# Patient Record
Sex: FEMALE | Race: WHITE | NOT HISPANIC OR LATINO | Employment: FULL TIME | URBAN - METROPOLITAN AREA
[De-identification: names, ages, dates, MRNs, and addresses within clinical notes are randomized per-mention and may not be internally consistent; named-entity substitution may affect disease eponyms.]

---

## 2023-05-22 LAB
CHLAMYDIA TRACHOMATIS RNA, TMA, UROGENITAL: NEGATIVE
HUMAN PAPILLOMAVIRUS (HPV): NORMAL
PAP RECOMMENDATION EXT: NORMAL
PAP SMEAR: NORMAL

## 2024-01-10 ENCOUNTER — OFFICE VISIT (OUTPATIENT)
Dept: FAMILY MEDICINE | Facility: CLINIC | Age: 34
End: 2024-01-10
Payer: COMMERCIAL

## 2024-01-10 VITALS
TEMPERATURE: 98 F | BODY MASS INDEX: 32.18 KG/M2 | HEIGHT: 64 IN | WEIGHT: 188.5 LBS | HEART RATE: 80 BPM | SYSTOLIC BLOOD PRESSURE: 122 MMHG | DIASTOLIC BLOOD PRESSURE: 74 MMHG | OXYGEN SATURATION: 98 %

## 2024-01-10 DIAGNOSIS — N20.0 NEPHROLITHIASIS: ICD-10-CM

## 2024-01-10 DIAGNOSIS — R19.8 ALTERNATING CONSTIPATION AND DIARRHEA: ICD-10-CM

## 2024-01-10 DIAGNOSIS — Z76.89 ENCOUNTER TO ESTABLISH CARE: Primary | ICD-10-CM

## 2024-01-10 DIAGNOSIS — R59.1 LYMPHADENOPATHY, GENERALIZED: ICD-10-CM

## 2024-01-10 DIAGNOSIS — M54.50 LOW BACK PAIN WITHOUT SCIATICA, UNSPECIFIED BACK PAIN LATERALITY, UNSPECIFIED CHRONICITY: ICD-10-CM

## 2024-01-10 DIAGNOSIS — R30.0 DYSURIA: ICD-10-CM

## 2024-01-10 DIAGNOSIS — D50.8 IRON DEFICIENCY ANEMIA SECONDARY TO INADEQUATE DIETARY IRON INTAKE: ICD-10-CM

## 2024-01-10 LAB
BILIRUB SERPL-MCNC: NEGATIVE MG/DL
BLOOD URINE, POC: NEGATIVE
CLARITY, POC UA: CLEAR
COLOR, POC UA: YELLOW
GLUCOSE UR QL STRIP: NEGATIVE
KETONES UR QL STRIP: NEGATIVE
LEUKOCYTE ESTERASE URINE, POC: NEGATIVE
NITRITE, POC UA: NEGATIVE
PH, POC UA: 6.5
PROTEIN, POC: NEGATIVE
SPECIFIC GRAVITY, POC UA: 1.02
UROBILINOGEN, POC UA: 0.2

## 2024-01-10 PROCEDURE — 99999 PR PBB SHADOW E&M-EST. PATIENT-LVL IV: CPT | Mod: PBBFAC,,, | Performed by: STUDENT IN AN ORGANIZED HEALTH CARE EDUCATION/TRAINING PROGRAM

## 2024-01-10 PROCEDURE — 3008F BODY MASS INDEX DOCD: CPT | Mod: CPTII,S$GLB,, | Performed by: STUDENT IN AN ORGANIZED HEALTH CARE EDUCATION/TRAINING PROGRAM

## 2024-01-10 PROCEDURE — 87491 CHLMYD TRACH DNA AMP PROBE: CPT | Performed by: STUDENT IN AN ORGANIZED HEALTH CARE EDUCATION/TRAINING PROGRAM

## 2024-01-10 PROCEDURE — 81002 URINALYSIS NONAUTO W/O SCOPE: CPT | Mod: S$GLB,,, | Performed by: STUDENT IN AN ORGANIZED HEALTH CARE EDUCATION/TRAINING PROGRAM

## 2024-01-10 PROCEDURE — 1160F RVW MEDS BY RX/DR IN RCRD: CPT | Mod: CPTII,S$GLB,, | Performed by: STUDENT IN AN ORGANIZED HEALTH CARE EDUCATION/TRAINING PROGRAM

## 2024-01-10 PROCEDURE — 3078F DIAST BP <80 MM HG: CPT | Mod: CPTII,S$GLB,, | Performed by: STUDENT IN AN ORGANIZED HEALTH CARE EDUCATION/TRAINING PROGRAM

## 2024-01-10 PROCEDURE — 1159F MED LIST DOCD IN RCRD: CPT | Mod: CPTII,S$GLB,, | Performed by: STUDENT IN AN ORGANIZED HEALTH CARE EDUCATION/TRAINING PROGRAM

## 2024-01-10 PROCEDURE — 3074F SYST BP LT 130 MM HG: CPT | Mod: CPTII,S$GLB,, | Performed by: STUDENT IN AN ORGANIZED HEALTH CARE EDUCATION/TRAINING PROGRAM

## 2024-01-10 PROCEDURE — 99214 OFFICE O/P EST MOD 30 MIN: CPT | Mod: S$GLB,,, | Performed by: STUDENT IN AN ORGANIZED HEALTH CARE EDUCATION/TRAINING PROGRAM

## 2024-01-10 RX ORDER — TIZANIDINE 2 MG/1
4 TABLET ORAL EVERY 8 HOURS PRN
Qty: 30 TABLET | Refills: 0 | Status: SHIPPED | OUTPATIENT
Start: 2024-01-10 | End: 2024-01-20

## 2024-01-10 RX ORDER — TAMSULOSIN HYDROCHLORIDE 0.4 MG/1
0.4 CAPSULE ORAL DAILY
Qty: 30 CAPSULE | Refills: 1 | Status: SHIPPED | OUTPATIENT
Start: 2024-01-10

## 2024-01-10 NOTE — PROGRESS NOTES
SUBJECTIVE:    CHIEF COMPLAINT:   Chief Complaint   Patient presents with    Establish Care     Swollen lymph nodes            274}    HISTORY OF PRESENT ILLNESS:  Clare Neal is a 33 y.o. female with history of positive cervical HPV s/p resection, nephrolithiasis, who presents to the clinic to establish care.  She reports that she continues to experience some left-sided low back pain which is intermittent in nature.  Prior CT imaging performed in 2023 notable for 2 mm renal stone.  She also reports that she occasionally has diarrhea and constipation.  Symptoms have been associated with bloating and she is unsure of which foods are causative of symptoms.    Discussed keeping a food diary of foods that may cause bloating.  Further recommended that she try tamsulosin to assist with passing renal stone.  Patient in agreement with care plan. Denies any fevers, chills, chest pain, shortness of breath, nausea vomiting or diarrhea     Last Pap Smear: Apr/May 2023 - Normal     PAST MEDICAL HISTORY:     274}  Past Medical History:   Diagnosis Date    COVID        PAST SURGICAL HISTORY:  Past Surgical History:   Procedure Laterality Date    ANKLE SURGERY Left      SECTION      ROBOT-ASSISTED CHOLECYSTECTOMY USING DA JANA XI N/A 2021    Procedure: XI ROBOTIC CHOLECYSTECTOMY;  Surgeon: Stewart Valverde MD;  Location: [facility];  Service: General;  Laterality: N/A;       SOCIAL HISTORY:  Social History     Socioeconomic History    Marital status:    Tobacco Use    Smoking status: Former    Smokeless tobacco: Never   Substance and Sexual Activity    Alcohol use: Yes     Comment: socially    Drug use: Never       FAMILY HISTORY:       History reviewed. No pertinent family history.    ALLERGIES AND MEDICATIONS: updated and reviewed.      274}  Review of patient's allergies indicates:  No Known Allergies  Medication List with Changes/Refills   New Medications    TAMSULOSIN (FLOMAX) 0.4 MG CAP     "Take 1 capsule (0.4 mg total) by mouth once daily.    TIZANIDINE (ZANAFLEX) 2 MG TABLET    Take 2 tablets (4 mg total) by mouth every 8 (eight) hours as needed (Back pain).   Current Medications    FERROUS SULFATE (IRON ORAL)    Take by mouth once daily.   Discontinued Medications    FLUOCINONIDE-EMOLLIENT (FLUOCINONIDE-E) 0.05 % CREA    Apply topically 2 (two) times daily.    PHENTERMINE (ADIPEX-P) 37.5 MG TABLET    TAKE 1 TABLET(37.5 MG) BY MOUTH BEFORE BREAKFAST       SCREENING HISTORY:    274}  Health Maintenance         Date Due Completion Date    COVID-19 Vaccine (1) Never done ---    TETANUS VACCINE Never done ---    Influenza Vaccine (1) 09/01/2023 10/18/2022 (Declined)    Override on 10/18/2022: Declined    Cervical Cancer Screening 04/07/2024 4/7/2021            REVIEW OF SYSTEMS:   Review of Systems   All other systems reviewed and are negative.      PHYSICAL EXAM:      274}  /74 (BP Location: Right arm, Patient Position: Sitting, BP Method: Small (Manual))   Pulse 80   Temp 97.9 °F (36.6 °C)   Ht 5' 4" (1.626 m)   Wt 85.5 kg (188 lb 7.9 oz)   SpO2 98%   BMI 32.35 kg/m²   Wt Readings from Last 3 Encounters:   01/10/24 85.5 kg (188 lb 7.9 oz)   09/29/23 81.6 kg (180 lb)   09/27/23 81.4 kg (179 lb 8 oz)     BP Readings from Last 3 Encounters:   01/10/24 122/74   09/29/23 116/70   09/27/23 124/82     Estimated body mass index is 32.35 kg/m² as calculated from the following:    Height as of this encounter: 5' 4" (1.626 m).    Weight as of this encounter: 85.5 kg (188 lb 7.9 oz).     Physical Exam  Vitals reviewed.   Constitutional:       General: She is not in acute distress.     Appearance: Normal appearance. She is well-developed and normal weight. She is not ill-appearing.   HENT:      Head: Normocephalic and atraumatic.      Right Ear: External ear normal.      Left Ear: External ear normal.      Nose: Nose normal.   Eyes:      Extraocular Movements: Extraocular movements intact.      " Conjunctiva/sclera: Conjunctivae normal.      Pupils: Pupils are equal, round, and reactive to light.   Neck:      Thyroid: No thyroid mass.   Cardiovascular:      Rate and Rhythm: Normal rate and regular rhythm.      Pulses: Normal pulses.      Heart sounds: Normal heart sounds, S1 normal and S2 normal. No murmur heard.     No gallop.   Pulmonary:      Effort: Pulmonary effort is normal. No respiratory distress.      Breath sounds: Normal breath sounds and air entry. No stridor. No wheezing, rhonchi or rales.   Abdominal:      General: Bowel sounds are normal. There is no distension.      Palpations: Abdomen is soft. There is no mass.      Tenderness: There is no abdominal tenderness.   Musculoskeletal:         General: No swelling or deformity. Normal range of motion.      Cervical back: Normal range of motion and neck supple. No edema or erythema.   Skin:     General: Skin is warm and dry.      Findings: No lesion or rash.   Neurological:      General: No focal deficit present.      Mental Status: She is alert and oriented to person, place, and time. Mental status is at baseline.   Psychiatric:         Mood and Affect: Mood normal.         Behavior: Behavior normal. Behavior is cooperative.         Judgment: Judgment normal.         LABS:   274}  I have reviewed old labs below:  Lab Results   Component Value Date    WBC 5.50 09/29/2023    HGB 12.3 09/29/2023    HCT 37.4 09/29/2023    MCV 84 09/29/2023     09/29/2023     09/29/2023    K 3.8 09/29/2023     09/29/2023    CALCIUM 9.2 09/29/2023    CO2 28 09/29/2023    GLU 85 09/29/2023    BUN 14 09/29/2023    CREATININE 0.5 09/29/2023    ANIONGAP 6 (L) 09/29/2023    ESTGFRAFRICA >60.0 06/29/2022    EGFRNONAA >60.0 06/29/2022    PROT 7.3 09/29/2023    ALBUMIN 4.4 09/29/2023    BILITOT 0.4 09/29/2023    ALKPHOS 72 09/29/2023    ALT 51 (H) 09/29/2023    AST 33 09/29/2023    CHOL 147 06/29/2022    TRIG 85 06/29/2022    HDL 48 06/29/2022    LDLCALC 82.0  06/29/2022    TSH 2.570 06/29/2022       ASSESSMENT AND PLAN:  274}  1. Encounter to establish care  -     HIV 1/2 Ag/Ab (4th Gen); Future; Expected date: 01/10/2024  -     Hepatitis C Antibody; Future; Expected date: 01/10/2024  -     TSH; Future; Expected date: 01/10/2024  -     Lipid Panel; Future; Expected date: 01/10/2024  -     Hemoglobin A1C; Future; Expected date: 01/10/2024  -     Comprehensive Metabolic Panel; Future; Expected date: 01/10/2024  -     CBC Without Differential; Future; Expected date: 01/10/2024  -     Ferritin; Future; Expected date: 01/10/2024    2. Dysuria  Comments:  neg POCT testing  Orders:  -     POCT URINE DIPSTICK WITHOUT MICROSCOPE  -     C. trachomatis/N. gonorrhoeae by AMP DNA; Future; Expected date: 01/10/2024    3. Lymphadenopathy, generalized  -     HETEROPHILE AB SCREEN; Future; Expected date: 01/10/2024    4. Iron deficiency anemia secondary to inadequate dietary iron intake  -     IRON AND TIBC; Future; Expected date: 01/10/2024  -     Transferrin; Future; Expected date: 01/10/2024    5. Nephrolithiasis  -     tamsulosin (FLOMAX) 0.4 mg Cap; Take 1 capsule (0.4 mg total) by mouth once daily.  Dispense: 30 capsule; Refill: 1    6. Alternating constipation and diarrhea  Comments:  Recommended keeping food diary.  If diarrhea persistent suggested stool studies and possible GI referral    7. Low back pain without sciatica, unspecified back pain laterality, unspecified chronicity  -     tiZANidine (ZANAFLEX) 2 MG tablet; Take 2 tablets (4 mg total) by mouth every 8 (eight) hours as needed (Back pain).  Dispense: 30 tablet; Refill: 0           Orders Placed This Encounter   Procedures    C. trachomatis/N. gonorrhoeae by AMP DNA    HIV 1/2 Ag/Ab (4th Gen)    Hepatitis C Antibody    TSH    Lipid Panel    Hemoglobin A1C    Comprehensive Metabolic Panel    CBC Without Differential    Ferritin    HETEROPHILE AB SCREEN    IRON AND TIBC    Transferrin    POCT URINE DIPSTICK WITHOUT  MICROSCOPE       Follow up in about 4 weeks (around 2/7/2024) for Virtual Visit - f/u labs, Flomax use. or sooner as needed.

## 2024-01-11 RX ORDER — TAMSULOSIN HYDROCHLORIDE 0.4 MG/1
0.4 CAPSULE ORAL
Qty: 90 CAPSULE | OUTPATIENT
Start: 2024-01-11

## 2024-01-12 ENCOUNTER — LAB VISIT (OUTPATIENT)
Dept: LAB | Facility: HOSPITAL | Age: 34
End: 2024-01-12
Attending: STUDENT IN AN ORGANIZED HEALTH CARE EDUCATION/TRAINING PROGRAM
Payer: COMMERCIAL

## 2024-01-12 DIAGNOSIS — D50.8 IRON DEFICIENCY ANEMIA SECONDARY TO INADEQUATE DIETARY IRON INTAKE: ICD-10-CM

## 2024-01-12 DIAGNOSIS — R59.1 LYMPHADENOPATHY, GENERALIZED: ICD-10-CM

## 2024-01-12 DIAGNOSIS — Z76.89 ENCOUNTER TO ESTABLISH CARE: ICD-10-CM

## 2024-01-12 LAB
ALBUMIN SERPL BCP-MCNC: 3.8 G/DL (ref 3.5–5.2)
ALP SERPL-CCNC: 57 U/L (ref 55–135)
ALT SERPL W/O P-5'-P-CCNC: 29 U/L (ref 10–44)
ANION GAP SERPL CALC-SCNC: 8 MMOL/L (ref 8–16)
AST SERPL-CCNC: 20 U/L (ref 10–40)
BILIRUB SERPL-MCNC: 0.5 MG/DL (ref 0.1–1)
BUN SERPL-MCNC: 13 MG/DL (ref 6–20)
C TRACH DNA SPEC QL NAA+PROBE: NOT DETECTED
CALCIUM SERPL-MCNC: 8.7 MG/DL (ref 8.7–10.5)
CHLORIDE SERPL-SCNC: 109 MMOL/L (ref 95–110)
CHOLEST SERPL-MCNC: 143 MG/DL (ref 120–199)
CHOLEST/HDLC SERPL: 3 {RATIO} (ref 2–5)
CO2 SERPL-SCNC: 24 MMOL/L (ref 23–29)
CREAT SERPL-MCNC: 0.6 MG/DL (ref 0.5–1.4)
ERYTHROCYTE [DISTWIDTH] IN BLOOD BY AUTOMATED COUNT: 13.2 % (ref 11.5–14.5)
EST. GFR  (NO RACE VARIABLE): >60 ML/MIN/1.73 M^2
ESTIMATED AVG GLUCOSE: 97 MG/DL (ref 68–131)
FERRITIN SERPL-MCNC: 41 NG/ML (ref 20–300)
GLUCOSE SERPL-MCNC: 98 MG/DL (ref 70–110)
HBA1C MFR BLD: 5 % (ref 4–5.6)
HCT VFR BLD AUTO: 39.3 % (ref 37–48.5)
HCV AB SERPL QL IA: NORMAL
HDLC SERPL-MCNC: 48 MG/DL (ref 40–75)
HDLC SERPL: 33.6 % (ref 20–50)
HETEROPH AB SERPL QL IA: NEGATIVE
HGB BLD-MCNC: 12.8 G/DL (ref 12–16)
HIV 1+2 AB+HIV1 P24 AG SERPL QL IA: NORMAL
IRON SERPL-MCNC: 76 UG/DL (ref 30–160)
LDLC SERPL CALC-MCNC: 78.6 MG/DL (ref 63–159)
MCH RBC QN AUTO: 28.8 PG (ref 27–31)
MCHC RBC AUTO-ENTMCNC: 32.6 G/DL (ref 32–36)
MCV RBC AUTO: 88 FL (ref 82–98)
N GONORRHOEA DNA SPEC QL NAA+PROBE: NOT DETECTED
NONHDLC SERPL-MCNC: 95 MG/DL
PLATELET # BLD AUTO: 214 K/UL (ref 150–450)
PMV BLD AUTO: 11.1 FL (ref 9.2–12.9)
POTASSIUM SERPL-SCNC: 3.9 MMOL/L (ref 3.5–5.1)
PROT SERPL-MCNC: 6.5 G/DL (ref 6–8.4)
RBC # BLD AUTO: 4.45 M/UL (ref 4–5.4)
SATURATED IRON: 19 % (ref 20–50)
SODIUM SERPL-SCNC: 141 MMOL/L (ref 136–145)
TOTAL IRON BINDING CAPACITY: 400 UG/DL (ref 250–450)
TRANSFERRIN SERPL-MCNC: 270 MG/DL (ref 200–375)
TRANSFERRIN SERPL-MCNC: 270 MG/DL (ref 200–375)
TRIGL SERPL-MCNC: 82 MG/DL (ref 30–150)
TSH SERPL DL<=0.005 MIU/L-ACNC: 1.4 UIU/ML (ref 0.4–4)
WBC # BLD AUTO: 4.02 K/UL (ref 3.9–12.7)

## 2024-01-12 PROCEDURE — 83540 ASSAY OF IRON: CPT | Performed by: STUDENT IN AN ORGANIZED HEALTH CARE EDUCATION/TRAINING PROGRAM

## 2024-01-12 PROCEDURE — 84443 ASSAY THYROID STIM HORMONE: CPT | Performed by: STUDENT IN AN ORGANIZED HEALTH CARE EDUCATION/TRAINING PROGRAM

## 2024-01-12 PROCEDURE — 82728 ASSAY OF FERRITIN: CPT | Performed by: STUDENT IN AN ORGANIZED HEALTH CARE EDUCATION/TRAINING PROGRAM

## 2024-01-12 PROCEDURE — 36415 COLL VENOUS BLD VENIPUNCTURE: CPT | Mod: PO | Performed by: STUDENT IN AN ORGANIZED HEALTH CARE EDUCATION/TRAINING PROGRAM

## 2024-01-12 PROCEDURE — 85027 COMPLETE CBC AUTOMATED: CPT | Performed by: STUDENT IN AN ORGANIZED HEALTH CARE EDUCATION/TRAINING PROGRAM

## 2024-01-12 PROCEDURE — 80061 LIPID PANEL: CPT | Performed by: STUDENT IN AN ORGANIZED HEALTH CARE EDUCATION/TRAINING PROGRAM

## 2024-01-12 PROCEDURE — 80053 COMPREHEN METABOLIC PANEL: CPT | Performed by: STUDENT IN AN ORGANIZED HEALTH CARE EDUCATION/TRAINING PROGRAM

## 2024-01-12 PROCEDURE — 86308 HETEROPHILE ANTIBODY SCREEN: CPT | Mod: PO | Performed by: STUDENT IN AN ORGANIZED HEALTH CARE EDUCATION/TRAINING PROGRAM

## 2024-01-12 PROCEDURE — 83036 HEMOGLOBIN GLYCOSYLATED A1C: CPT | Performed by: STUDENT IN AN ORGANIZED HEALTH CARE EDUCATION/TRAINING PROGRAM

## 2024-01-12 PROCEDURE — 86803 HEPATITIS C AB TEST: CPT | Performed by: STUDENT IN AN ORGANIZED HEALTH CARE EDUCATION/TRAINING PROGRAM

## 2024-01-12 PROCEDURE — 87389 HIV-1 AG W/HIV-1&-2 AB AG IA: CPT | Performed by: STUDENT IN AN ORGANIZED HEALTH CARE EDUCATION/TRAINING PROGRAM

## 2024-02-07 NOTE — PATIENT INSTRUCTIONS
Jose C Sparks,     If you are due for any health screening(s) below please notify me so we can arrange them to be ordered and scheduled to maintain your health. Most healthy patients complete it. Don't lose out on improving your health.     All of your core healthy metrics are met.              Dear Ms. Neal:    Your [facility] Care Team is dedicated to helping you stay healthy with regularly scheduled recommended screenings.  Scheduling routine screenings is important to maintaining good health. Our records indicate that you may be overdue for your screening pap smear. A pap smear screening can help identify patients at risk for developing cervical cancer at an early stage, when it is most likely to be successfully treated.    We encourage you to schedule your appointment with your [facility] provider or some primary care providers also perform this screening.    If you have completed or scheduled your pap smear screening outside of [facility], please notify your primary care team so we can update your health record.      If you have questions or would like to schedule your screening, please contact your primary care clinic.    Sincerely,    Your [facility] Team                 18

## 2024-05-20 ENCOUNTER — OFFICE VISIT (OUTPATIENT)
Dept: FAMILY MEDICINE | Facility: CLINIC | Age: 34
End: 2024-05-20
Payer: COMMERCIAL

## 2024-05-20 ENCOUNTER — HOSPITAL ENCOUNTER (OUTPATIENT)
Dept: RADIOLOGY | Facility: HOSPITAL | Age: 34
Discharge: HOME | End: 2024-05-20
Attending: OBSTETRICS & GYNECOLOGY
Payer: COMMERCIAL

## 2024-05-20 VITALS
HEART RATE: 80 BPM | WEIGHT: 186 LBS | DIASTOLIC BLOOD PRESSURE: 68 MMHG | SYSTOLIC BLOOD PRESSURE: 118 MMHG | OXYGEN SATURATION: 98 % | BODY MASS INDEX: 31.93 KG/M2 | TEMPERATURE: 98 F

## 2024-05-20 DIAGNOSIS — Z98.890 HISTORY OF ANKLE SURGERY: ICD-10-CM

## 2024-05-20 DIAGNOSIS — R87.629 ABNORMAL PAP SMEAR OF VAGINA: ICD-10-CM

## 2024-05-20 DIAGNOSIS — Z00.00 ENCOUNTER FOR PREVENTIVE CARE: ICD-10-CM

## 2024-05-20 DIAGNOSIS — E61.1 IRON DEFICIENCY: Primary | ICD-10-CM

## 2024-05-20 DIAGNOSIS — Z90.49 HISTORY OF CHOLECYSTECTOMY: ICD-10-CM

## 2024-05-20 DIAGNOSIS — Z98.891 HISTORY OF CESAREAN SECTION: ICD-10-CM

## 2024-05-20 DIAGNOSIS — K59.00 CONSTIPATION, UNSPECIFIED CONSTIPATION TYPE: ICD-10-CM

## 2024-05-20 DIAGNOSIS — N93.9 ABNORMAL UTERINE BLEEDING: Primary | ICD-10-CM

## 2024-05-20 DIAGNOSIS — N93.9 ABNORMAL UTERINE BLEEDING: ICD-10-CM

## 2024-05-20 PROCEDURE — 76856 US EXAM PELVIC COMPLETE: CPT | Mod: 26,,, | Performed by: RADIOLOGY

## 2024-05-20 PROCEDURE — 76830 TRANSVAGINAL US NON-OB: CPT | Mod: 26,,, | Performed by: RADIOLOGY

## 2024-05-20 PROCEDURE — 3008F BODY MASS INDEX DOCD: CPT | Mod: CPTII,S$GLB,, | Performed by: FAMILY MEDICINE

## 2024-05-20 PROCEDURE — 99395 PREV VISIT EST AGE 18-39: CPT | Mod: S$GLB,,, | Performed by: FAMILY MEDICINE

## 2024-05-20 PROCEDURE — 3074F SYST BP LT 130 MM HG: CPT | Mod: CPTII,S$GLB,, | Performed by: FAMILY MEDICINE

## 2024-05-20 PROCEDURE — 76830 TRANSVAGINAL US NON-OB: CPT | Mod: TC

## 2024-05-20 PROCEDURE — 3044F HG A1C LEVEL LT 7.0%: CPT | Mod: CPTII,S$GLB,, | Performed by: FAMILY MEDICINE

## 2024-05-20 PROCEDURE — 99999 PR PBB SHADOW E&M-EST. PATIENT-LVL III: CPT | Mod: PBBFAC,,, | Performed by: FAMILY MEDICINE

## 2024-05-20 PROCEDURE — 99213 OFFICE O/P EST LOW 20 MIN: CPT | Mod: 25,S$GLB,, | Performed by: FAMILY MEDICINE

## 2024-05-20 PROCEDURE — 76856 US EXAM PELVIC COMPLETE: CPT | Mod: TC

## 2024-05-20 PROCEDURE — 1160F RVW MEDS BY RX/DR IN RCRD: CPT | Mod: CPTII,S$GLB,, | Performed by: FAMILY MEDICINE

## 2024-05-20 PROCEDURE — 3078F DIAST BP <80 MM HG: CPT | Mod: CPTII,S$GLB,, | Performed by: FAMILY MEDICINE

## 2024-05-20 PROCEDURE — 1159F MED LIST DOCD IN RCRD: CPT | Mod: CPTII,S$GLB,, | Performed by: FAMILY MEDICINE

## 2024-05-21 ENCOUNTER — PATIENT MESSAGE (OUTPATIENT)
Dept: ADMINISTRATIVE | Facility: HOSPITAL | Age: 34
End: 2024-05-21
Payer: COMMERCIAL

## 2024-05-21 PROBLEM — Z90.49 HISTORY OF CHOLECYSTECTOMY: Status: ACTIVE | Noted: 2024-05-21

## 2024-05-22 NOTE — PROGRESS NOTES
Subjective:       Patient ID: Clare Neal is a 33 y.o. female.    Chief Complaint: Diarrhea, Constipation (Bloating ), and Nausea     Here for new patient visit.    Social history former smoker none for 10 years.  Rare alcohol intake.  Works at [employer].  No regular exercise.      Family history alcohol abuse in mother who is .  Father unknown history.  Five siblings are healthy.    Immunizations TD AP is due.    Past medical history.  Cholecystectomy in .   2 para 2 A/B 0.  Two C sections.  Left ankle surgery at age 8.  Abnormal Pap in 2017 okay since then.  History of some iron deficiency.  Has had CBC CMP and lipids not too long ago there were okay.      Review of Systems   Constitutional: Negative.           Weight down from 230 lb to 186 lb.  Has been dieting.   HENT: Negative.     Eyes: Negative.    Respiratory: Negative.     Cardiovascular: Negative.    Gastrointestinal:  Positive for constipation and diarrhea.   Endocrine: Negative.    Genitourinary: Negative.    Musculoskeletal: Negative.    Skin: Negative.    Allergic/Immunologic: Negative.    Neurological: Negative.    Hematological: Negative.    Psychiatric/Behavioral: Negative.     All other systems reviewed and are negative.      Objective:      Physical Exam  Vitals and nursing note reviewed.   Constitutional:       Appearance: Normal appearance. She is well-developed and normal weight.   HENT:      Head: Normocephalic and atraumatic.      Right Ear: Tympanic membrane normal.      Left Ear: Tympanic membrane normal.      Nose: Nose normal.      Mouth/Throat:      Mouth: Mucous membranes are moist.   Eyes:      Conjunctiva/sclera: Conjunctivae normal.      Pupils: Pupils are equal, round, and reactive to light.   Neck:      Vascular: No carotid bruit.   Cardiovascular:      Rate and Rhythm: Normal rate and regular rhythm.      Pulses: Normal pulses.      Heart sounds: Normal heart sounds. No murmur heard.     No gallop.    Pulmonary:      Effort: Pulmonary effort is normal.      Breath sounds: Normal breath sounds.   Abdominal:      General: Bowel sounds are normal.      Palpations: Abdomen is soft.      Tenderness: There is no abdominal tenderness.   Musculoskeletal:         General: Normal range of motion.      Cervical back: Normal range of motion.      Right lower leg: No edema.      Left lower leg: No edema.   Lymphadenopathy:      Cervical: No cervical adenopathy.   Skin:     General: Skin is warm and dry.   Neurological:      General: No focal deficit present.      Mental Status: She is alert and oriented to person, place, and time.   Psychiatric:         Behavior: Behavior normal.         Thought Content: Thought content normal.         Judgment: Judgment normal.         Assessment:       1. Iron deficiency    2. Abnormal Pap smear of vagina    3. History of  section    4. History of ankle surgery    5. History of cholecystectomy    6. BMI 31.0-31.9,adult    7. Constipation, unspecified constipation type    8. Encounter for preventive care        Plan:       Iron deficiency  -     CBC Auto Differential; Future; Expected date: 2024  -     Comprehensive Metabolic Panel; Future; Expected date: 2024  -     TSH; Future; Expected date: 2024  -     Ferritin; Future; Expected date: 2024    Abnormal Pap smear of vagina    History of  section    History of ankle surgery    History of cholecystectomy    BMI 31.0-31.9,adult    Constipation, unspecified constipation type    Encounter for preventive care    Other orders  -     (In Office Administered) Tdap Vaccine  -     cholestyramine-aspartame (QUESTRAN/PREVALITE LIGHT) 4 gram Powd; 1 scoop every 12 hours  Dispense: 231 g; Refill: 5      TD AP.  Pap smear is current.  Was done by Dr. Alonso Huber at [facility] May of 2023.    In addition to routine physical.  Alternating diarrhea and constipation.  So had constipation for up to 3 days then will use  MiraLax and have diarrhea which will last for up to 4 days.  So using laxative every 3 or 4 days.  Often on problem.  Going on for months now.  Started a probiotic with no help.  Some diarrhea with the use laxative but then it is ongoing for several days.  Episodes of diarrhea always follow the bout of constipation.  No normal stools.  This is all been worsened since her gallbladder surgery she believes.    Physical examination.  Vital signs noted.  Neck without bruit.  Chest clear.  Heart regular rate and rhythm.  Abdomen bowel sounds are positive soft and nontender.    Impression.  Alternating diarrhea and constipation.  Could be post cholecystectomy diarrhea.    Plan discussed etiology of post cholecystectomy diarrhea.  Would like her to go on very low-fat diet.  Try Questran 1 scoop b.I.d. 1 can with 5 refills.  Check CBC CMP TSH and a ferritin.  Follow-up in 3 weeks if she has not improved significantly using this medication.  And with diet restrictions.  Will refer her to Gastroenterology for possible colonoscopy.

## 2024-05-28 ENCOUNTER — PATIENT OUTREACH (OUTPATIENT)
Dept: ADMINISTRATIVE | Facility: HOSPITAL | Age: 34
End: 2024-05-28
Payer: COMMERCIAL

## 2024-05-28 DIAGNOSIS — N93.9 ABNORMAL UTERINE BLEEDING (AUB): Primary | ICD-10-CM

## 2024-05-28 NOTE — PROGRESS NOTES
Population Health Chart Review & Patient Outreach Details      Additional Pop Health Notes:    CAMPAIGN- Preventative Care Screening           Updates Requested / Reviewed:      Updated Care Coordination Note, Care Everywhere, , and External Sources: [facility] and [employer]         Health Maintenance Topics Overdue:      VBHM Score: 0     Patient is not due for any topics at this time.                       Health Maintenance Topic(s) Outreach Outcomes & Actions Taken:    Cervical Cancer Screening - Outreach Outcomes & Actions Taken  : External Records Uploaded & Care Team Updated if Applicable    Lab(s) - Outreach Outcomes & Actions Taken  : External Records Uploaded & Care Team Updated if Applicable